# Patient Record
Sex: MALE | Race: BLACK OR AFRICAN AMERICAN | NOT HISPANIC OR LATINO | Employment: OTHER | URBAN - METROPOLITAN AREA
[De-identification: names, ages, dates, MRNs, and addresses within clinical notes are randomized per-mention and may not be internally consistent; named-entity substitution may affect disease eponyms.]

---

## 2018-01-15 ENCOUNTER — OFFICE VISIT (OUTPATIENT)
Dept: URGENT CARE | Facility: PHYSICIAN GROUP | Age: 68
End: 2018-01-15
Payer: MEDICARE

## 2018-01-15 ENCOUNTER — HOSPITAL ENCOUNTER (OUTPATIENT)
Dept: RADIOLOGY | Facility: MEDICAL CENTER | Age: 68
End: 2018-01-15
Attending: PHYSICIAN ASSISTANT
Payer: MEDICARE

## 2018-01-15 VITALS
HEART RATE: 87 BPM | DIASTOLIC BLOOD PRESSURE: 84 MMHG | RESPIRATION RATE: 13 BRPM | TEMPERATURE: 98 F | HEIGHT: 71 IN | OXYGEN SATURATION: 97 % | WEIGHT: 219 LBS | BODY MASS INDEX: 30.66 KG/M2 | SYSTOLIC BLOOD PRESSURE: 138 MMHG

## 2018-01-15 DIAGNOSIS — M17.11 OSTEOARTHRITIS OF RIGHT KNEE, UNSPECIFIED OSTEOARTHRITIS TYPE: ICD-10-CM

## 2018-01-15 DIAGNOSIS — M25.561 ACUTE PAIN OF RIGHT KNEE: ICD-10-CM

## 2018-01-15 PROCEDURE — 99203 OFFICE O/P NEW LOW 30 MIN: CPT | Performed by: PHYSICIAN ASSISTANT

## 2018-01-15 PROCEDURE — 73562 X-RAY EXAM OF KNEE 3: CPT | Mod: RT

## 2018-01-15 RX ORDER — LISINOPRIL 5 MG/1
5 TABLET ORAL DAILY
COMMUNITY

## 2018-01-15 RX ORDER — ATORVASTATIN CALCIUM 20 MG/1
20 TABLET, FILM COATED ORAL NIGHTLY
COMMUNITY

## 2018-01-15 ASSESSMENT — ENCOUNTER SYMPTOMS
HEADACHES: 0
COUGH: 0
EYE REDNESS: 0
DIARRHEA: 0
BACK PAIN: 0
FEVER: 0
CHILLS: 0
SORE THROAT: 0
EYE DISCHARGE: 0
JOINT SWELLING: 0
VOMITING: 0
ABDOMINAL PAIN: 0
FALLS: 0

## 2018-01-15 ASSESSMENT — PAIN SCALES - GENERAL: PAINLEVEL: 9=SEVERE PAIN

## 2018-01-15 NOTE — PROGRESS NOTES
"Subjective:      Eugene Pillai is a 67 y.o. male who presents with Knee Pain (right knee. no swelling. x 1 month)            Patient is 67-year-old male who presents with right knee pain off and on for the last month. Patient reports any \"can't think of anything that he might of done to hurt it\". He reports pain to the medial aspect - worse with sitting for too long. He denies any swelling, redness, increased warmth. He has not trialed anything for his symptoms.      Knee Pain   This is a new problem. The current episode started more than 1 month ago. The problem occurs constantly. The problem has been waxing and waning. Pertinent negatives include no abdominal pain, chills, congestion, coughing, fever, headaches, joint swelling, rash, sore throat or vomiting. Exacerbated by: pivoting. He has tried ice for the symptoms. The treatment provided no relief.       Review of Systems   Constitutional: Negative for chills, fever and malaise/fatigue.   HENT: Negative for congestion and sore throat.    Eyes: Negative for discharge and redness.   Respiratory: Negative for cough.    Cardiovascular: Negative for leg swelling.   Gastrointestinal: Negative for abdominal pain, diarrhea and vomiting.   Musculoskeletal: Negative for back pain, falls, joint pain and joint swelling.   Skin: Negative for itching and rash.   Neurological: Negative for headaches.   All other systems reviewed and are negative.         Objective:     /84   Pulse 87   Temp 36.7 °C (98 °F)   Resp 13   Ht 1.803 m (5' 11\")   Wt 99.3 kg (219 lb)   SpO2 97%   BMI 30.54 kg/m²    PMH:  has no past medical history on file.  MEDS:   Current Outpatient Prescriptions:   •  lisinopril (PRINIVIL) 5 MG Tab, Take 5 mg by mouth every day., Disp: , Rfl:   •  atorvastatin (LIPITOR) 20 MG Tab, Take 20 mg by mouth every evening., Disp: , Rfl:   ALLERGIES: Not on File  SURGHX: History reviewed. No pertinent surgical history.  SOCHX:  reports that he has never " smoked. He has never used smokeless tobacco.  FH: Family history was reviewed, no pertinent findings to report    Physical Exam   Constitutional: He is oriented to person, place, and time. He appears well-developed and well-nourished.   HENT:   Head: Normocephalic and atraumatic.   Eyes: EOM are normal. Pupils are equal, round, and reactive to light.   Neck: Normal range of motion. Neck supple.   Cardiovascular: Normal rate.    Pulmonary/Chest: Effort normal. No respiratory distress.   Musculoskeletal: Normal range of motion. He exhibits no edema.        Right knee: He exhibits normal range of motion, no swelling, no effusion, no deformity and no bony tenderness. Tenderness found. Medial joint line tenderness noted. No lateral joint line and no patellar tendon tenderness noted.        Legs:  Pain with valgus stress.   Neurological: He is alert and oriented to person, place, and time.   Skin: Skin is warm. Capillary refill takes less than 2 seconds.   Psychiatric: He has a normal mood and affect. His behavior is normal. Judgment and thought content normal.   Vitals reviewed.         XR knee:     1.  There is moderate osteoarthritic change of the medial compartment and mild osteoarthritic change of the lateral and patellofemoral compartments of the right knee.  Assessment/Plan:     1. Acute pain of right knee  - DX-KNEE 3 VIEWS RIGHT; Future  - REFERRAL TO SPORTS MEDICINE    2. Osteoarthritis of right knee, unspecified osteoarthritis type    Referral to Dr. Danielle for further tx. And management.   Start Ibuprofen 600mg With breakfast and QHS.   Ice as needed.   See Sports med.   Patient given precautionary s/sx that mandate immediate follow up and evaluation in the ED. Advised of risks of not doing so.    DDX, Supportive care, and indications for immediate follow-up discussed with patient.    Instructed to return to clinic or nearest emergency department if we are not available for any change in condition, further  concerns, or worsening of symptoms.    The patient demonstrated a good understanding and agreed with the treatment plan.

## 2018-08-28 VITALS — SYSTOLIC BLOOD PRESSURE: 146 MMHG | DIASTOLIC BLOOD PRESSURE: 81 MMHG

## 2018-09-10 ENCOUNTER — HOSPITAL ENCOUNTER (OUTPATIENT)
Dept: HOSPITAL 8 - OUT | Age: 68
Setting detail: OBSERVATION
LOS: 1 days | Discharge: HOME | End: 2018-09-11
Attending: ORTHOPAEDIC SURGERY | Admitting: ORTHOPAEDIC SURGERY
Payer: MEDICARE

## 2018-09-10 VITALS — SYSTOLIC BLOOD PRESSURE: 151 MMHG | DIASTOLIC BLOOD PRESSURE: 81 MMHG

## 2018-09-10 VITALS — BODY MASS INDEX: 29.44 KG/M2 | HEIGHT: 71 IN | WEIGHT: 210.32 LBS

## 2018-09-10 VITALS — DIASTOLIC BLOOD PRESSURE: 65 MMHG | SYSTOLIC BLOOD PRESSURE: 154 MMHG

## 2018-09-10 VITALS — SYSTOLIC BLOOD PRESSURE: 155 MMHG | DIASTOLIC BLOOD PRESSURE: 69 MMHG

## 2018-09-10 DIAGNOSIS — M17.11: Primary | ICD-10-CM

## 2018-09-10 PROCEDURE — 97150 GROUP THERAPEUTIC PROCEDURES: CPT

## 2018-09-10 PROCEDURE — 96365 THER/PROPH/DIAG IV INF INIT: CPT

## 2018-09-10 PROCEDURE — 73560 X-RAY EXAM OF KNEE 1 OR 2: CPT

## 2018-09-10 PROCEDURE — C1713 ANCHOR/SCREW BN/BN,TIS/BN: HCPCS

## 2018-09-10 PROCEDURE — 27447 TOTAL KNEE ARTHROPLASTY: CPT

## 2018-09-10 PROCEDURE — G0378 HOSPITAL OBSERVATION PER HR: HCPCS

## 2018-09-10 PROCEDURE — 97161 PT EVAL LOW COMPLEX 20 MIN: CPT

## 2018-09-10 PROCEDURE — 97116 GAIT TRAINING THERAPY: CPT

## 2018-09-10 PROCEDURE — C1776 JOINT DEVICE (IMPLANTABLE): HCPCS

## 2018-09-10 PROCEDURE — 96366 THER/PROPH/DIAG IV INF ADDON: CPT

## 2018-09-10 RX ADMIN — ASPIRIN SCH MG: 81 TABLET, COATED ORAL at 21:15

## 2018-09-10 RX ADMIN — CEFAZOLIN SCH MLS/HR: 1 INJECTION, POWDER, FOR SOLUTION INTRAVENOUS at 15:38

## 2018-09-11 VITALS — SYSTOLIC BLOOD PRESSURE: 165 MMHG | DIASTOLIC BLOOD PRESSURE: 87 MMHG

## 2018-09-11 VITALS — SYSTOLIC BLOOD PRESSURE: 132 MMHG | DIASTOLIC BLOOD PRESSURE: 64 MMHG

## 2018-09-11 RX ADMIN — ASPIRIN SCH MG: 81 TABLET, COATED ORAL at 08:41

## 2018-09-11 RX ADMIN — CEFAZOLIN SCH MLS/HR: 1 INJECTION, POWDER, FOR SOLUTION INTRAVENOUS at 03:28

## 2021-01-15 DIAGNOSIS — Z23 NEED FOR VACCINATION: ICD-10-CM
